# Patient Record
Sex: FEMALE | Race: BLACK OR AFRICAN AMERICAN | Employment: UNEMPLOYED | URBAN - METROPOLITAN AREA
[De-identification: names, ages, dates, MRNs, and addresses within clinical notes are randomized per-mention and may not be internally consistent; named-entity substitution may affect disease eponyms.]

---

## 2019-02-27 ENCOUNTER — OFFICE VISIT (OUTPATIENT)
Dept: URGENT CARE | Facility: URGENT CARE | Age: 34
End: 2019-02-27
Payer: COMMERCIAL

## 2019-02-27 VITALS
TEMPERATURE: 98.4 F | SYSTOLIC BLOOD PRESSURE: 120 MMHG | OXYGEN SATURATION: 98 % | DIASTOLIC BLOOD PRESSURE: 82 MMHG | HEART RATE: 68 BPM

## 2019-02-27 DIAGNOSIS — B34.9 VIRAL ILLNESS: ICD-10-CM

## 2019-02-27 DIAGNOSIS — M79.10 MUSCLE SORENESS: Primary | ICD-10-CM

## 2019-02-27 PROCEDURE — 99203 OFFICE O/P NEW LOW 30 MIN: CPT | Performed by: FAMILY MEDICINE

## 2019-02-27 NOTE — PATIENT INSTRUCTIONS
Take ibuprofen and or tylenol every 4-6 hours for pain    Drink 4-6 bottles of water a day      If you develop worsening symptoms or spike a fever (temp 100.4 or higher) please call the clinic for a recommendation

## 2019-02-27 NOTE — PROGRESS NOTES
Subjective:   Beth Barbosa is a 33 year old female who presents for   Chief Complaint   Patient presents with     Urgent Care     Vomiting     Myalgia and feeling warm started yesterday, nausea and vomiting started today     Nausea with some dry heaving starting today. Sore muscles all over including the shoulder starting yesterday. She has been feeling warm but no recorded fevers. Has taken tylenol a couple times which has helped a little bit. Denies difficulty with breathing. Denies sore throat. Able to drink fluids without difficulty. No cough symptoms.     Denies possibility of being pregnant at this time. No intercourse in the last few months    Did not get the flu shot this year.     Patient Active Problem List    Diagnosis Date Noted     Indication for care in labor and delivery, antepartum 06/08/2016     Priority: Medium     Labor and delivery indication for care or intervention 06/07/2016     Priority: Medium     Immune thrombocytopenia affecting pregnancy in third trimester (H) 06/07/2016     Priority: Medium     White classification A1 gestational diabetes mellitus 06/07/2016     Priority: Medium     GBS (group B Streptococcus carrier), +RV culture, currently pregnant 06/07/2016     Priority: Medium       Current Outpatient Medications   Medication     Ferrous Sulfate (IRON SUPPLEMENT PO)     oxyCODONE (ROXICODONE) 5 MG immediate release tablet     Prenatal Vit-Fe Fumarate-FA (PRENATAL MULTIVITAMIN  PLUS IRON) 27-0.8 MG TABS     senna-docusate (SENOKOT-S;PERICOLACE) 8.6-50 MG per tablet     No current facility-administered medications for this visit.        ROS:  As above per HPI    Objective:   /82 (BP Location: Right arm, Patient Position: Chair, Cuff Size: Adult Regular)   Pulse 68   Temp 98.4  F (36.9  C) (Oral)   SpO2 98% , There is no height or weight on file to calculate BMI.  Gen:  NAD, well-nourished, sitting in chair comfortably  HEENT: EOMI, sclera anicteric, Head normocephalic, ;  nares patent; moist mucous membrane, no trismus  Neck: trachea midline, no thyromegaly  CV:  Hemodynamically stable, RRR  Pulm:  no increased work of breathing , CTAB, no wheezes/rales/rhonchi   Extrem: no cyanosis, edema or clubbing  Skin: no obvious rashes or abnormalities  Psych: Euthymic, linear thoughts, normal rate of speech      Assessment & Plan:   Beth Barbosa, 33 year old female who presents with:  Muscle soreness  Viral illness  With lack of fevers and cough I doubt flu or pneumonia thus testing was deferred. Non-specific illness but likely viral process causing her symptoms. Tylenol/ibuprofen for discomfort recommended if she develops new symptoms she should contact us, especially If having fever.   Normal vital signs and appears well hydrated today.       Benitez Ordonez MD   [facility] [place] [facility]    The use of Dragon/[employer]! dictation services may have been used to construct the content in this note; any grammatical or spelling errors are non-intentional. Please contact the author of this note directly if you are in need of any clarification.

## 2019-12-21 ENCOUNTER — OFFICE VISIT (OUTPATIENT)
Dept: URGENT CARE | Facility: URGENT CARE | Age: 34
End: 2019-12-21
Payer: COMMERCIAL

## 2019-12-21 VITALS
WEIGHT: 182.2 LBS | SYSTOLIC BLOOD PRESSURE: 110 MMHG | OXYGEN SATURATION: 100 % | BODY MASS INDEX: 28.54 KG/M2 | TEMPERATURE: 98.8 F | HEART RATE: 86 BPM | DIASTOLIC BLOOD PRESSURE: 74 MMHG | RESPIRATION RATE: 16 BRPM

## 2019-12-21 DIAGNOSIS — R07.0 THROAT PAIN: Primary | ICD-10-CM

## 2019-12-21 DIAGNOSIS — J02.0 STREP THROAT: ICD-10-CM

## 2019-12-21 LAB
DEPRECATED S PYO AG THROAT QL EIA: ABNORMAL
SPECIMEN SOURCE: ABNORMAL

## 2019-12-21 PROCEDURE — 87880 STREP A ASSAY W/OPTIC: CPT | Performed by: PHYSICIAN ASSISTANT

## 2019-12-21 PROCEDURE — 99213 OFFICE O/P EST LOW 20 MIN: CPT | Performed by: PREVENTIVE MEDICINE

## 2019-12-21 RX ORDER — METFORMIN HCL 500 MG
2000 TABLET, EXTENDED RELEASE 24 HR ORAL
COMMUNITY
Start: 2019-10-01 | End: 2020-09-30

## 2019-12-21 RX ORDER — AMOXICILLIN 500 MG/1
500 CAPSULE ORAL 2 TIMES DAILY
Qty: 20 CAPSULE | Refills: 0 | Status: SHIPPED | OUTPATIENT
Start: 2019-12-21 | End: 2019-12-31

## 2019-12-21 NOTE — PATIENT INSTRUCTIONS
Strep throat  Amoxicillin 500 mg two times per day for 10 days    PLAN:   See orders in epic.   Symptomatic treat with gargles, lozenges, and OTC analgesic as needed. Follow-up with primary clinic if not improving.  Advisement given that patient will be contagious for the next 24-48 hours after antibiotics initiated

## 2019-12-21 NOTE — PROGRESS NOTES
SUBJECTIVE:  Beth Barbosa is a 34 year old female with a chief complaint of sore throat.  Onset of symptoms was 1 day(s) ago.    Course of illness: sudden onset.  Severity moderate  Current and Associated symptoms: fever and chills  Treatment measures tried include Tylenol/Ibuprofen.  Predisposing factors include None.    Past Medical History:   Diagnosis Date     Anemia      Diabetes (H)     diet controled gest diabetes     GBS (group B Streptococcus carrier), +RV culture, currently pregnant 6/7/2016     Immune thrombocytopenia affecting pregnancy in third trimester (H) 6/7/2016     Current Outpatient Medications   Medication Sig Dispense Refill     amoxicillin (AMOXIL) 500 MG capsule Take 1 capsule (500 mg) by mouth 2 times daily for 10 days 20 capsule 0     metFORMIN (GLUCOPHAGE-XR) 500 MG 24 hr tablet Take 2,000 mg by mouth       Ferrous Sulfate (IRON SUPPLEMENT PO) Take 160 mg by mouth       oxyCODONE (ROXICODONE) 5 MG immediate release tablet Take 1-2 tablets (5-10 mg) by mouth every 3 hours as needed for moderate to severe pain (Patient not taking: Reported on 2/27/2019) 50 tablet 0     Prenatal Vit-Fe Fumarate-FA (PRENATAL MULTIVITAMIN  PLUS IRON) 27-0.8 MG TABS Take 1 tablet by mouth daily       senna-docusate (SENOKOT-S;PERICOLACE) 8.6-50 MG per tablet Take 1-2 tablets by mouth 2 times daily (Patient not taking: Reported on 2/27/2019) 60 tablet 0     Social History     Tobacco Use     Smoking status: Never Smoker     Smokeless tobacco: Never Used   Substance Use Topics     Alcohol use: No       ROS:  INTEGUMENTARY/SKIN: NEGATIVE for worrisome rashes, moles or lesions  EYES: NEGATIVE for vision changes or irritation  RESP:NEGATIVE for significant cough or SOB  CV: NEGATIVE for chest pain, palpitations or peripheral edema  GI: NEGATIVE for nausea, abdominal pain, heartburn, or change in bowel habits  MUSCULOSKELETAL: NEGATIVE for significant arthralgias or myalgia  NEURO: NEGATIVE for weakness, dizziness  or paresthesias  ENDOCRINE: NEGATIVE for temperature intolerance, skin/hair changes  HEME/ALLERGY/IMMUNE: NEGATIVE for bleeding problems    OBJECTIVE:   /74   Pulse 86   Temp 98.8  F (37.1  C) (Oral)   Resp 16   Wt 82.6 kg (182 lb 3.2 oz)   SpO2 100%   BMI 28.54 kg/m    GENERAL APPEARANCE: healthy, alert and no distress  EYES: EOMI,  PERRL, conjunctiva clear  HENT: ear canals and TM's normal.  Nose normal.  Pharynx erythematous with some exudate noted.  NECK: supple, non-tender to palpation, no adenopathy noted  RESP: lungs clear to auscultation - no rales, rhonchi or wheezes  CV: regular rates and rhythm, normal S1 S2, no murmur noted  ABDOMEN:  soft, nontender, no HSM or masses and bowel sounds normal  SKIN: no suspicious lesions or rashes    Rapid Strep test is positive    ASSESSMENT:      Throat pain  Strep throat  Amoxicillin 500 mg two times per day for 10 days    PLAN:   See orders in epic.   Symptomatic treat with gargles, lozenges, and OTC analgesic as needed. Follow-up with primary clinic if not improving.  Advisement given that patient will be contagious for the next 24-48 hours after antibiotics initiated